# Patient Record
Sex: MALE | Race: WHITE | URBAN - METROPOLITAN AREA
[De-identification: names, ages, dates, MRNs, and addresses within clinical notes are randomized per-mention and may not be internally consistent; named-entity substitution may affect disease eponyms.]

---

## 2017-08-24 ENCOUNTER — APPOINTMENT (RX ONLY)
Dept: URBAN - METROPOLITAN AREA CLINIC 169 | Facility: CLINIC | Age: 47
Setting detail: DERMATOLOGY
End: 2017-08-24

## 2017-08-24 DIAGNOSIS — L63.8 OTHER ALOPECIA AREATA: ICD-10-CM

## 2017-08-24 PROCEDURE — ? COUNSELING

## 2017-08-24 PROCEDURE — 11901 INJECT SKIN LESIONS >7: CPT

## 2017-08-24 PROCEDURE — ? INTRALESIONAL KENALOG

## 2017-08-24 ASSESSMENT — LOCATION DETAILED DESCRIPTION DERM
LOCATION DETAILED: RIGHT CENTRAL SUBMANDIBULAR CHEEK
LOCATION DETAILED: RIGHT MEDIAL SUBMANDIBULAR CHEEK
LOCATION DETAILED: SUBMENTAL CHIN
LOCATION DETAILED: LEFT CENTRAL SUBMANDIBULAR CHEEK
LOCATION DETAILED: LEFT LATERAL SUBMANDIBULAR CHEEK
LOCATION DETAILED: RIGHT SUBMANDIBULAR AREA
LOCATION DETAILED: LEFT MEDIAL MANDIBULAR CHEEK

## 2017-08-24 ASSESSMENT — LOCATION SIMPLE DESCRIPTION DERM
LOCATION SIMPLE: LEFT CHEEK
LOCATION SIMPLE: SUBMENTAL CHIN
LOCATION SIMPLE: RIGHT SUBMANDIBULAR AREA
LOCATION SIMPLE: RIGHT CHEEK

## 2017-08-24 ASSESSMENT — LOCATION ZONE DERM: LOCATION ZONE: FACE

## 2017-08-24 NOTE — PROCEDURE: INTRALESIONAL KENALOG
Medical Necessity Clause: This procedure was medically necessary because the lesions that were treated were:
Kenalog Preparation: Kenalog
Administered By (Optional): Dr. Coleman
Concentration Of Solution Injected (Mg/Ml): 5.0
Consent: The risks of atrophy were reviewed with the patient.
Detail Level: Detailed
Treatment Number (Optional): 1
Include Z78.9 (Other Specified Conditions Influencing Health Status) As An Associated Diagnosis?: No
Total Volume Injected (Ccs- Only Use Numbers And Decimals): 0.5
X Size Of Lesion In Cm (Optional): 0

## 2017-10-26 ENCOUNTER — APPOINTMENT (RX ONLY)
Dept: URBAN - METROPOLITAN AREA CLINIC 169 | Facility: CLINIC | Age: 47
Setting detail: DERMATOLOGY
End: 2017-10-26

## 2017-10-26 DIAGNOSIS — L63.8 OTHER ALOPECIA AREATA: ICD-10-CM

## 2017-10-26 PROCEDURE — ? COUNSELING

## 2017-10-26 PROCEDURE — 11901 INJECT SKIN LESIONS >7: CPT

## 2017-10-26 PROCEDURE — ? INTRALESIONAL KENALOG

## 2017-10-26 PROCEDURE — ? DIAGNOSIS COMMENT

## 2017-10-26 ASSESSMENT — LOCATION DETAILED DESCRIPTION DERM
LOCATION DETAILED: LEFT MEDIAL MANDIBULAR CHEEK
LOCATION DETAILED: RIGHT MEDIAL SUBMANDIBULAR CHEEK
LOCATION DETAILED: LEFT CENTRAL SUBMANDIBULAR CHEEK
LOCATION DETAILED: SUBMENTAL CHIN
LOCATION DETAILED: RIGHT SUBMANDIBULAR AREA
LOCATION DETAILED: LEFT LATERAL SUBMANDIBULAR CHEEK
LOCATION DETAILED: RIGHT LATERAL SUBMANDIBULAR CHEEK
LOCATION DETAILED: RIGHT CENTRAL SUBMANDIBULAR CHEEK

## 2017-10-26 ASSESSMENT — LOCATION SIMPLE DESCRIPTION DERM
LOCATION SIMPLE: RIGHT CHEEK
LOCATION SIMPLE: SUBMENTAL CHIN
LOCATION SIMPLE: LEFT CHEEK
LOCATION SIMPLE: RIGHT SUBMANDIBULAR AREA

## 2017-10-26 ASSESSMENT — LOCATION ZONE DERM: LOCATION ZONE: FACE

## 2017-10-26 NOTE — PROCEDURE: INTRALESIONAL KENALOG
Medical Necessity Clause: This procedure was medically necessary because the lesions that were treated were:
Total Volume Injected (Ccs- Only Use Numbers And Decimals): 0.7
X Size Of Lesion In Cm (Optional): 0
Include Z78.9 (Other Specified Conditions Influencing Health Status) As An Associated Diagnosis?: No
Concentration Of Solution Injected (Mg/Ml): 5.0
Kenalog Preparation: Kenalog
Consent: The risks of atrophy were reviewed with the patient.
Detail Level: Detailed
Treatment Number (Optional): 1
Administered By (Optional): Dr. Coleman

## 2018-02-01 ENCOUNTER — APPOINTMENT (RX ONLY)
Dept: URBAN - METROPOLITAN AREA CLINIC 169 | Facility: CLINIC | Age: 48
Setting detail: DERMATOLOGY
End: 2018-02-01

## 2018-02-01 DIAGNOSIS — L63.8 OTHER ALOPECIA AREATA: ICD-10-CM

## 2018-02-01 PROCEDURE — ? INTRALESIONAL KENALOG

## 2018-02-01 PROCEDURE — 11900 INJECT SKIN LESIONS </W 7: CPT

## 2018-02-01 ASSESSMENT — LOCATION DETAILED DESCRIPTION DERM
LOCATION DETAILED: RIGHT CENTRAL SUBMANDIBULAR CHEEK
LOCATION DETAILED: RIGHT LATERAL SUBMANDIBULAR CHEEK

## 2018-02-01 ASSESSMENT — LOCATION SIMPLE DESCRIPTION DERM: LOCATION SIMPLE: RIGHT CHEEK

## 2018-02-01 ASSESSMENT — LOCATION ZONE DERM: LOCATION ZONE: FACE

## 2018-02-01 NOTE — PROCEDURE: INTRALESIONAL KENALOG
X Size Of Lesion In Cm (Optional): 0
Administered By (Optional): Dr. Coleman
Total Volume Injected (Ccs- Only Use Numbers And Decimals): 0.3
Lot # (Optional): [id number]
Expiration Date (Optional): January 2019
Ndc# For Kenalog Only: 0934-5662-77
Kenalog Preparation: Kenalog
Concentration Of Solution Injected (Mg/Ml): 10.0
Include Z78.9 (Other Specified Conditions Influencing Health Status) As An Associated Diagnosis?: No
Consent: The risks of atrophy were reviewed with the patient.
Detail Level: Detailed
Medical Necessity Clause: This procedure was medically necessary because the lesions that were treated were: